# Patient Record
(demographics unavailable — no encounter records)

---

## 2025-05-07 NOTE — HISTORY OF PRESENT ILLNESS
[de-identified] : 26 yo m for the past 3 yrs in Winter gets epistaxis in the shower, only on the left side- this year has had 10 - 11 days of epistaxis in a row. He gets them to stop by holding head up and walking outside. It will also spontaneously start at the gym and can last 5-20 min. Denies medical problems or anticoagulant or antiplatelet medications. no meds. no otc. nonsmoker. no fh relevant to cc.

## 2025-05-07 NOTE — ASSESSMENT
[FreeTextEntry1] : l ds- not symptomatic epistaxis cauterized with silver nitrate under endoscopic guidance after risks/benefits and alternatives were discussed bacitracin ointment tid shown how to apply and given some rec call if recurs and can try laser cautery if needed

## 2025-05-07 NOTE — PROCEDURE
[Epistaxis] : evaluation of epistaxis [Topical Lidocaine] : topical lidocaine [Oxymetazoline HCl] : oxymetazoline HCl [Flexible Endoscope] : examined with the flexible endoscope [Serial Number: ___] : Serial Number: [unfilled] [Deviated to the Lt] : deviated to the left [Nasal Septum Mucosa Bleeding Left] : bleeding on the left [Cauterized w/Silver Nitrate] : the bleeding was cauterized with silver nitrate [Normal] : the nasopharynx was normal [de-identified] : done for epistaxis - could not see posterior nasal cavity with speculum and posterior source of bleed needed to be excluded [FreeTextEntry6] : vessel on l anterior septum [place] with evidence of recent bleeding Mucosa: l vessel Mucus:b nl Polyps:b nl Inferior turbinate:b nl Middle turbinate:b nl Superior turbinate:b nl Inferior Meatus:b nl Middle Meatus:b nl Superior meatus:b nl Sphenoethmoidal recess:b nl

## 2025-05-07 NOTE — PHYSICAL EXAM
[Nasal Endoscopy Performed] : nasal endoscopy was performed, see procedure section for findings [] : septum deviated to the left [Midline] : trachea located in midline position [Normal] : no rashes [de-identified] : gait steady